# Patient Record
Sex: FEMALE | Race: WHITE | NOT HISPANIC OR LATINO
[De-identification: names, ages, dates, MRNs, and addresses within clinical notes are randomized per-mention and may not be internally consistent; named-entity substitution may affect disease eponyms.]

---

## 2023-07-03 DIAGNOSIS — O09.90 SUPERVISION OF HIGH RISK PREGNANCY, UNSPECIFIED, UNSPECIFIED TRIMESTER: ICD-10-CM

## 2023-07-17 ENCOUNTER — APPOINTMENT (OUTPATIENT)
Dept: MATERNAL FETAL MEDICINE | Facility: CLINIC | Age: 33
End: 2023-07-17

## 2023-07-17 ENCOUNTER — ASOB RESULT (OUTPATIENT)
Age: 33
End: 2023-07-17

## 2023-07-17 ENCOUNTER — APPOINTMENT (OUTPATIENT)
Dept: MATERNAL FETAL MEDICINE | Facility: CLINIC | Age: 33
End: 2023-07-17
Payer: COMMERCIAL

## 2023-07-17 PROCEDURE — 99203 OFFICE O/P NEW LOW 30 MIN: CPT | Mod: 95

## 2023-07-18 ENCOUNTER — TRANSCRIPTION ENCOUNTER (OUTPATIENT)
Age: 33
End: 2023-07-18

## 2023-07-18 ENCOUNTER — APPOINTMENT (OUTPATIENT)
Dept: CARDIOLOGY | Facility: CLINIC | Age: 33
End: 2023-07-18
Payer: COMMERCIAL

## 2023-07-18 DIAGNOSIS — Z3A.13 13 WEEKS GESTATION OF PREGNANCY: ICD-10-CM

## 2023-07-18 PROCEDURE — 99203 OFFICE O/P NEW LOW 30 MIN: CPT | Mod: 95

## 2023-07-26 ENCOUNTER — APPOINTMENT (OUTPATIENT)
Dept: PEDIATRIC CARDIOLOGY | Facility: CLINIC | Age: 33
End: 2023-07-26

## 2023-08-01 DIAGNOSIS — Q24.9 DISEASES OF THE CIRCULATORY SYSTEM COMPLICATING PREGNANCY, FIRST TRIMESTER: ICD-10-CM

## 2023-08-01 DIAGNOSIS — O99.411 DISEASES OF THE CIRCULATORY SYSTEM COMPLICATING PREGNANCY, FIRST TRIMESTER: ICD-10-CM

## 2023-08-02 ENCOUNTER — APPOINTMENT (OUTPATIENT)
Dept: PEDIATRIC CARDIOLOGY | Facility: CLINIC | Age: 33
End: 2023-08-02
Payer: COMMERCIAL

## 2023-08-02 VITALS
WEIGHT: 154.32 LBS | DIASTOLIC BLOOD PRESSURE: 72 MMHG | HEART RATE: 57 BPM | OXYGEN SATURATION: 100 % | HEIGHT: 66.14 IN | SYSTOLIC BLOOD PRESSURE: 115 MMHG | BODY MASS INDEX: 24.8 KG/M2

## 2023-08-02 DIAGNOSIS — Q21.0 VENTRICULAR SEPTAL DEFECT: ICD-10-CM

## 2023-08-02 DIAGNOSIS — Z78.9 OTHER SPECIFIED HEALTH STATUS: ICD-10-CM

## 2023-08-02 PROCEDURE — 93303 ECHO TRANSTHORACIC: CPT

## 2023-08-02 PROCEDURE — 93320 DOPPLER ECHO COMPLETE: CPT

## 2023-08-02 PROCEDURE — 93325 DOPPLER ECHO COLOR FLOW MAPG: CPT

## 2023-08-02 PROCEDURE — 99204 OFFICE O/P NEW MOD 45 MIN: CPT

## 2023-08-02 RX ORDER — LEVOTHYROXINE SODIUM 50 UG/1
50 CAPSULE ORAL DAILY
Refills: 0 | Status: ACTIVE | COMMUNITY

## 2023-08-02 NOTE — PHYSICAL EXAM
[General Appearance - Alert] : alert [General Appearance - Well Nourished] : well nourished [General Appearance - Well-Appearing] : well appearing [] : no respiratory distress [Chest Surgical / Traumatic Scar] : chest incision well healed [Heart Sounds] : normal S1 and S2 [Systolic] : systolic [II] : a grade 2/6

## 2023-08-02 NOTE — HISTORY OF PRESENT ILLNESS
[FreeTextEntry1] : We had the pleasure of seeing BEENA FONTANA for evaluation today in the Adult Congenital Heart Program at [facility]. This is her first visit here with us today. Beena is a 32-year-old female with an atrial septal defect and ventricular septal defect. Her atrial septal defect was closed with a Cardioseal device at 9-years of age. Her small VSD was not closed.  Beena is currently 16 weeks' gestation. This is her 2nd pregnancy. She has a 3-year-old daughter who was delivered via  at [place]. Her OB is Daisy Mayen and she is planning to deliver at [facility]. She developed hypothyroidism following her first pregnancy and remains on levothyroxine. In addition, she is taking a multivitamin. During her first trimester she struggled with nausea and extreme fatigue but is now feeling better.  From a cardiovascular perspective, she has no complaints of chest pain, palpitations, shortness of breath, peripheral edema, dizziness or syncope. Prior to pregnancy she was extremely active in kickboxing and is now just beginning to get back into walking since her nausea has subsided.

## 2023-08-02 NOTE — DISCUSSION/SUMMARY
[Needs SBE Prophylaxis] : [unfilled]  needs bacterial endocarditis prophylaxis. SBE prophylaxis is indicated for dental and invasive ENT procedures. (Circulation. 2007; 116: 4804-9772) [FreeTextEntry1] : Diana is 32year old female s/p repair of atrial septal defect with Cardioseal devise with trivial residual left to right shunting. There is no evidence of a VSD on echocardiogram. We reviewed the typical physiologic changes associated with pregnancy; specifically, that her blood volume will increase by 40-50% which translates to an increase in cardiac output. She will have an increase in her heart rate which may increase her risk of palpitations.  This usually occurs between 28-32 weeks but can occur anytime.   All pregnant women are at risk of pre-eclampsia, before delivery, during delivery and in the post-partum period.  We counseled her on the signs and symptoms for which she needs to seek immediate medical attention; pressure in her chest, significant leg swelling, fainting, new onset headaches, change in vision or any increased blood pressure [id number]. As a preventative measure we generally recommend Aspirin 162mg daily but have told her she should discuss this with her OB.  We discussed that there is a 7-8% absolute risk of congenital heart disease in children born to mothers with congenital heart disease. As a result, current guidelines would suggest a screening fetal echocardiogram between 18 and 22 weeks of gestation. Our nurse navigator will reach out to her to schedule an appointment.  Based on her anatomy she would be classified as a modified WHO maternal cardiac risk classification of I-II which translates to small increased risk of maternal complications, and she has been counseled and understands the risks related to pregnancy and her congenital heart disease. We anticipate her being able to carry to term and deliver via a vaginal route. Should she require an epidural, she should be able to have one. Should a  become necessary for obstetric and/ or fetal indications, she may receive general or spinal anesthesia. Due to her small residual shunt at the site of her repair SBE prophylaxis is indicated.  We would like to see her back between 30-32 weeks of gestation for a visit with an echocardiogram or sooner if she has any clinical concerns.

## 2023-08-02 NOTE — CARDIOLOGY SUMMARY
[Today's Date] : [unfilled] [FreeTextEntry1] : sinus bradycardia, vent rate 55 bpm [FreeTextEntry2] : Summary: 1. S/p ASD device closure. Trivial residual left-to-right shunting. 2. Normal left ventricular size, morphology and systolic function. 3. Normal right ventricular morphology with qualitatively normal size and systolic function. 4. No evidence of pulmonary hypertension based on systolic interventricular septal configuration, but quantitative estimates of pulmonary artery pressure were inadequate. 5. No pericardial effusion.

## 2023-08-02 NOTE — CONSULT LETTER
[Today's Date] : [unfilled] [Name] : Name: [unfilled] [] : : ~~ [Today's Date:] : [unfilled] [Dear  ___:] : Dear Dr. [unfilled]: [Consult] : I had the pleasure of evaluating your patient, [unfilled]. My full evaluation follows. [Sincerely,] : Sincerely, [___] : [unfilled] [Consult - Multiple Provider] : Thank you very much for allowing us to participate in the care of this patient. If you have any questions, please do not hesitate to contact us. [FreeTextEntry4] : Daisy Enciso, DO [FreeTextEntry5] : [address] [FreeTextEntry6] : [place] [address] [de-identified] : Janette Nguyễn, MSN, CPNP-AC, PC Pediatric Cardiology, Adult Congenital Cardiology [facility] Physician [facility]  Maura Vo MD, HUSSEIN Director, Adult Congenital Heart , High Risk Cardiovascular Obstetrics [facility] Physician [facility]  [address]: [phone] [facility] Office: [phone] [facility] Office: [phone]

## 2023-08-02 NOTE — REASON FOR VISIT
[Initial Consultation] : an initial consultation for [Atrial Septal Defect] : an atrial septal defect [Ventricular Septal Defect] : a ventricular septal defect [Patient] : patient [FreeTextEntry3] : ASD closure

## 2023-08-08 PROBLEM — Z3A.13 13 WEEKS GESTATION OF PREGNANCY: Status: ACTIVE | Noted: 2023-08-08

## 2023-08-08 NOTE — ASSESSMENT
[FreeTextEntry1] : Ms. Yair Valadez is a 32 year old female that presents to the Women's Heart Program for a cardiovascular evaluation during her current pregnancy.  She carries a medical  history of  hypothyoridsim and an ASD/VSD repair in 1999 at [facility].  Patient is 13 weeks pregnant.  #Hypothyroidism    Continue on Levothyroxine 50 mcg PO QD  #History of Congenital Heart Repair Recommending an  In-office physical examination and 12 lead EKG Echocardiogram to assess cardiac structure and function Referred to Congenital Specialist, Dr. Maura Vo   - Encouraged patient to participate in  healthy walking and eating habits, focusing on a Mediterranean style of eating.  - Encouraged the patient to find healthy outlets and coping mechanisms to help manage stress, such as reducing workload if possible, spending time with family and friends, engaging in an enjoyable hobby, or using meditation or mindfulness techniques.

## 2023-08-08 NOTE — HISTORY OF PRESENT ILLNESS
[Home] : at home, [unfilled] , at the time of the visit. [Other Location: e.g. Home (Enter Location, City,State)___] : at [unfilled] [Verbal consent obtained from patient] : the patient, [unfilled] [FreeTextEntry1] : Ms. Yair Valadez is a 32 year old female that presents to the Women's Heart Program for a cardiovascular evaluation during her current pregnancy.  She carries a medical  history of  hypothyoridsim and an ASD/VSD repair in  at [facility].  Patient is 13 weeks pregnant.  +Family history of hypothyroidism  Pregnancy history 1st pregnancy-     , full term, , no complications  2nd pregnancy-  current, 13 weeks  DUE DATE:    2024 LMP:               2023  Patient currently feels well and denies any issues with shortness of breath, chest discomfort or palpitations

## 2023-08-21 ENCOUNTER — APPOINTMENT (OUTPATIENT)
Dept: PEDIATRIC CARDIOLOGY | Facility: CLINIC | Age: 33
End: 2023-08-21
Payer: COMMERCIAL

## 2023-08-21 PROCEDURE — 76821 MIDDLE CEREBRAL ARTERY ECHO: CPT

## 2023-08-21 PROCEDURE — 76827 ECHO EXAM OF FETAL HEART: CPT

## 2023-08-21 PROCEDURE — 76820 UMBILICAL ARTERY ECHO: CPT

## 2023-08-21 PROCEDURE — 76825 ECHO EXAM OF FETAL HEART: CPT

## 2023-08-21 PROCEDURE — 99203 OFFICE O/P NEW LOW 30 MIN: CPT

## 2023-08-21 PROCEDURE — 93325 DOPPLER ECHO COLOR FLOW MAPG: CPT | Mod: 59

## 2023-09-01 ENCOUNTER — ASOB RESULT (OUTPATIENT)
Age: 33
End: 2023-09-01

## 2023-09-01 ENCOUNTER — APPOINTMENT (OUTPATIENT)
Dept: ANTEPARTUM | Facility: CLINIC | Age: 33
End: 2023-09-01
Payer: COMMERCIAL

## 2023-09-01 PROCEDURE — 76811 OB US DETAILED SNGL FETUS: CPT

## 2023-09-18 ENCOUNTER — APPOINTMENT (OUTPATIENT)
Dept: CARDIOLOGY | Facility: CLINIC | Age: 33
End: 2023-09-18

## 2023-10-02 ENCOUNTER — APPOINTMENT (OUTPATIENT)
Dept: CARDIOLOGY | Facility: CLINIC | Age: 33
End: 2023-10-02

## 2023-12-16 ENCOUNTER — APPOINTMENT (OUTPATIENT)
Dept: CARDIOLOGY | Facility: CLINIC | Age: 33
End: 2023-12-16
Payer: COMMERCIAL

## 2023-12-16 PROCEDURE — 93306 TTE W/DOPPLER COMPLETE: CPT

## 2023-12-19 ENCOUNTER — NON-APPOINTMENT (OUTPATIENT)
Age: 33
End: 2023-12-19

## 2024-01-19 ENCOUNTER — INPATIENT (INPATIENT)
Facility: HOSPITAL | Age: 34
LOS: 1 days | Discharge: HOME | End: 2024-01-21
Attending: OBSTETRICS & GYNECOLOGY | Admitting: OBSTETRICS & GYNECOLOGY
Payer: COMMERCIAL

## 2024-01-19 ENCOUNTER — TRANSCRIPTION ENCOUNTER (OUTPATIENT)
Age: 34
End: 2024-01-19

## 2024-01-19 VITALS — HEART RATE: 79 BPM | SYSTOLIC BLOOD PRESSURE: 129 MMHG | DIASTOLIC BLOOD PRESSURE: 52 MMHG

## 2024-01-19 DIAGNOSIS — Z87.74 PERSONAL HISTORY OF (CORRECTED) CONGENITAL MALFORMATIONS OF HEART AND CIRCULATORY SYSTEM: Chronic | ICD-10-CM

## 2024-01-19 DIAGNOSIS — O26.899 OTHER SPECIFIED PREGNANCY RELATED CONDITIONS, UNSPECIFIED TRIMESTER: ICD-10-CM

## 2024-01-19 DIAGNOSIS — Z34.80 ENCOUNTER FOR SUPERVISION OF OTHER NORMAL PREGNANCY, UNSPECIFIED TRIMESTER: ICD-10-CM

## 2024-01-19 DIAGNOSIS — Z98.890 OTHER SPECIFIED POSTPROCEDURAL STATES: Chronic | ICD-10-CM

## 2024-01-19 LAB
BASOPHILS # BLD AUTO: 0.05 K/UL — SIGNIFICANT CHANGE UP (ref 0–0.2)
BASOPHILS NFR BLD AUTO: 0.3 % — SIGNIFICANT CHANGE UP (ref 0–2)
EOSINOPHIL # BLD AUTO: 0.02 K/UL — SIGNIFICANT CHANGE UP (ref 0–0.5)
EOSINOPHIL NFR BLD AUTO: 0.1 % — SIGNIFICANT CHANGE UP (ref 0–6)
HCT VFR BLD CALC: 35.4 % — SIGNIFICANT CHANGE UP (ref 34.5–45)
HGB BLD-MCNC: 11.9 G/DL — SIGNIFICANT CHANGE UP (ref 11.5–15.5)
IMM GRANULOCYTES NFR BLD AUTO: 0.9 % — SIGNIFICANT CHANGE UP (ref 0–0.9)
LYMPHOCYTES # BLD AUTO: 14.2 % — SIGNIFICANT CHANGE UP (ref 13–44)
LYMPHOCYTES # BLD AUTO: 2.41 K/UL — SIGNIFICANT CHANGE UP (ref 1–3.3)
MCHC RBC-ENTMCNC: 29 PG — SIGNIFICANT CHANGE UP (ref 27–34)
MCHC RBC-ENTMCNC: 33.6 GM/DL — SIGNIFICANT CHANGE UP (ref 32–36)
MCV RBC AUTO: 86.3 FL — SIGNIFICANT CHANGE UP (ref 80–100)
MONOCYTES # BLD AUTO: 0.98 K/UL — HIGH (ref 0–0.9)
MONOCYTES NFR BLD AUTO: 5.8 % — SIGNIFICANT CHANGE UP (ref 2–14)
NEUTROPHILS # BLD AUTO: 13.33 K/UL — HIGH (ref 1.8–7.4)
NEUTROPHILS NFR BLD AUTO: 78.7 % — HIGH (ref 43–77)
NRBC # BLD: 0 /100 WBCS — SIGNIFICANT CHANGE UP (ref 0–0)
PLATELET # BLD AUTO: 136 K/UL — LOW (ref 150–400)
RBC # BLD: 4.1 M/UL — SIGNIFICANT CHANGE UP (ref 3.8–5.2)
RBC # FLD: 13.2 % — SIGNIFICANT CHANGE UP (ref 10.3–14.5)
WBC # BLD: 16.95 K/UL — HIGH (ref 3.8–10.5)
WBC # FLD AUTO: 16.95 K/UL — HIGH (ref 3.8–10.5)

## 2024-01-19 PROCEDURE — 86077 PHYS BLOOD BANK SERV XMATCH: CPT

## 2024-01-19 RX ORDER — OXYTOCIN 10 UNIT/ML
10 VIAL (ML) INJECTION ONCE
Refills: 0 | Status: COMPLETED | OUTPATIENT
Start: 2024-01-19 | End: 2024-01-19

## 2024-01-19 RX ORDER — TETANUS TOXOID, REDUCED DIPHTHERIA TOXOID AND ACELLULAR PERTUSSIS VACCINE, ADSORBED 5; 2.5; 8; 8; 2.5 [IU]/.5ML; [IU]/.5ML; UG/.5ML; UG/.5ML; UG/.5ML
0.5 SUSPENSION INTRAMUSCULAR ONCE
Refills: 0 | Status: DISCONTINUED | OUTPATIENT
Start: 2024-01-19 | End: 2024-01-21

## 2024-01-19 RX ORDER — IBUPROFEN 200 MG
600 TABLET ORAL EVERY 6 HOURS
Refills: 0 | Status: DISCONTINUED | OUTPATIENT
Start: 2024-01-19 | End: 2024-01-21

## 2024-01-19 RX ORDER — KETOROLAC TROMETHAMINE 30 MG/ML
30 SYRINGE (ML) INJECTION ONCE
Refills: 0 | Status: DISCONTINUED | OUTPATIENT
Start: 2024-01-19 | End: 2024-01-21

## 2024-01-19 RX ORDER — CHLORHEXIDINE GLUCONATE 213 G/1000ML
1 SOLUTION TOPICAL DAILY
Refills: 0 | Status: DISCONTINUED | OUTPATIENT
Start: 2024-01-19 | End: 2024-01-19

## 2024-01-19 RX ORDER — LANOLIN
1 OINTMENT (GRAM) TOPICAL EVERY 6 HOURS
Refills: 0 | Status: DISCONTINUED | OUTPATIENT
Start: 2024-01-19 | End: 2024-01-21

## 2024-01-19 RX ORDER — MAGNESIUM HYDROXIDE 400 MG/1
30 TABLET, CHEWABLE ORAL
Refills: 0 | Status: DISCONTINUED | OUTPATIENT
Start: 2024-01-19 | End: 2024-01-21

## 2024-01-19 RX ORDER — SODIUM CHLORIDE 9 MG/ML
1000 INJECTION, SOLUTION INTRAVENOUS
Refills: 0 | Status: DISCONTINUED | OUTPATIENT
Start: 2024-01-19 | End: 2024-01-19

## 2024-01-19 RX ORDER — OXYCODONE HYDROCHLORIDE 5 MG/1
5 TABLET ORAL
Refills: 0 | Status: DISCONTINUED | OUTPATIENT
Start: 2024-01-19 | End: 2024-01-21

## 2024-01-19 RX ORDER — OXYTOCIN 10 UNIT/ML
41.67 VIAL (ML) INJECTION
Qty: 20 | Refills: 0 | Status: DISCONTINUED | OUTPATIENT
Start: 2024-01-19 | End: 2024-01-21

## 2024-01-19 RX ORDER — HYDROCORTISONE 1 %
1 OINTMENT (GRAM) TOPICAL EVERY 6 HOURS
Refills: 0 | Status: DISCONTINUED | OUTPATIENT
Start: 2024-01-19 | End: 2024-01-21

## 2024-01-19 RX ORDER — AER TRAVELER 0.5 G/1
1 SOLUTION RECTAL; TOPICAL EVERY 4 HOURS
Refills: 0 | Status: DISCONTINUED | OUTPATIENT
Start: 2024-01-19 | End: 2024-01-21

## 2024-01-19 RX ORDER — IBUPROFEN 200 MG
600 TABLET ORAL EVERY 6 HOURS
Refills: 0 | Status: COMPLETED | OUTPATIENT
Start: 2024-01-19 | End: 2024-12-17

## 2024-01-19 RX ORDER — SIMETHICONE 80 MG/1
80 TABLET, CHEWABLE ORAL EVERY 4 HOURS
Refills: 0 | Status: DISCONTINUED | OUTPATIENT
Start: 2024-01-19 | End: 2024-01-21

## 2024-01-19 RX ORDER — SODIUM CHLORIDE 9 MG/ML
3 INJECTION INTRAMUSCULAR; INTRAVENOUS; SUBCUTANEOUS EVERY 8 HOURS
Refills: 0 | Status: DISCONTINUED | OUTPATIENT
Start: 2024-01-19 | End: 2024-01-21

## 2024-01-19 RX ORDER — CITRIC ACID/SODIUM CITRATE 300-500 MG
15 SOLUTION, ORAL ORAL EVERY 6 HOURS
Refills: 0 | Status: DISCONTINUED | OUTPATIENT
Start: 2024-01-19 | End: 2024-01-19

## 2024-01-19 RX ORDER — DIBUCAINE 1 %
1 OINTMENT (GRAM) RECTAL EVERY 6 HOURS
Refills: 0 | Status: DISCONTINUED | OUTPATIENT
Start: 2024-01-19 | End: 2024-01-21

## 2024-01-19 RX ORDER — OXYTOCIN 10 UNIT/ML
333.33 VIAL (ML) INJECTION
Qty: 20 | Refills: 0 | Status: DISCONTINUED | OUTPATIENT
Start: 2024-01-19 | End: 2024-01-21

## 2024-01-19 RX ORDER — ACETAMINOPHEN 500 MG
975 TABLET ORAL
Refills: 0 | Status: DISCONTINUED | OUTPATIENT
Start: 2024-01-19 | End: 2024-01-21

## 2024-01-19 RX ORDER — PRAMOXINE HYDROCHLORIDE 150 MG/15G
1 AEROSOL, FOAM RECTAL EVERY 4 HOURS
Refills: 0 | Status: DISCONTINUED | OUTPATIENT
Start: 2024-01-19 | End: 2024-01-21

## 2024-01-19 RX ORDER — DIPHENHYDRAMINE HCL 50 MG
25 CAPSULE ORAL EVERY 6 HOURS
Refills: 0 | Status: DISCONTINUED | OUTPATIENT
Start: 2024-01-19 | End: 2024-01-21

## 2024-01-19 RX ORDER — LEVOTHYROXINE SODIUM 125 MCG
50 TABLET ORAL DAILY
Refills: 0 | Status: DISCONTINUED | OUTPATIENT
Start: 2024-01-19 | End: 2024-01-21

## 2024-01-19 RX ORDER — BENZOCAINE 10 %
1 GEL (GRAM) MUCOUS MEMBRANE EVERY 6 HOURS
Refills: 0 | Status: DISCONTINUED | OUTPATIENT
Start: 2024-01-19 | End: 2024-01-21

## 2024-01-19 RX ORDER — PIPERACILLIN AND TAZOBACTAM 4; .5 G/20ML; G/20ML
4.5 INJECTION, POWDER, LYOPHILIZED, FOR SOLUTION INTRAVENOUS EVERY 8 HOURS
Refills: 0 | Status: COMPLETED | OUTPATIENT
Start: 2024-01-19 | End: 2024-01-20

## 2024-01-19 RX ORDER — OXYTOCIN 10 UNIT/ML
2 VIAL (ML) INJECTION
Qty: 30 | Refills: 0 | Status: DISCONTINUED | OUTPATIENT
Start: 2024-01-19 | End: 2024-01-21

## 2024-01-19 RX ORDER — OXYCODONE HYDROCHLORIDE 5 MG/1
5 TABLET ORAL ONCE
Refills: 0 | Status: DISCONTINUED | OUTPATIENT
Start: 2024-01-19 | End: 2024-01-21

## 2024-01-19 RX ADMIN — Medication 0.2 MILLIGRAM(S): at 16:51

## 2024-01-19 RX ADMIN — Medication 600 MILLIGRAM(S): at 23:00

## 2024-01-19 RX ADMIN — PIPERACILLIN AND TAZOBACTAM 200 GRAM(S): 4; .5 INJECTION, POWDER, LYOPHILIZED, FOR SOLUTION INTRAVENOUS at 18:45

## 2024-01-19 RX ADMIN — Medication 2 MILLIUNIT(S)/MIN: at 13:50

## 2024-01-19 RX ADMIN — Medication 10 UNIT(S): at 16:49

## 2024-01-19 RX ADMIN — Medication 975 MILLIGRAM(S): at 20:03

## 2024-01-19 NOTE — PROVIDER CONTACT NOTE (OTHER) - RECOMMENDATIONS
As per MIGUELITO Gutierrez, pt to be given po tyenol and toradol to be given on postpartum, pt allowed to go to floor as per MIGUELITO Gutierrez

## 2024-01-19 NOTE — DISCHARGE NOTE OB - PATIENT PORTAL LINK FT
You can access the FollowMyHealth Patient Portal offered by [facility] by registering at the following website: [email or web address]. By joining [employer]’s FollowMyHealth portal, you will also be able to view your health information using other applications (apps) compatible with our system.

## 2024-01-19 NOTE — PROVIDER CONTACT NOTE (OTHER) - ASSESSMENT
Bleeding moderate, fundus midline, pt reports no headache or dizziness. pt was able to void 600ml at 1950

## 2024-01-19 NOTE — DISCHARGE NOTE OB - CARE PLAN
Principal Discharge DX:	Vaginal delivery  Assessment and plan of treatment:	Nothing per vagina x 2 weeks - no intercourse, no tampons  Shower only, no baths/pools  Tylenol, motrin prn   1 Principal Discharge DX:	Vaginal delivery  Assessment and plan of treatment:	Nothing per vagina x 6 weeks - no intercourse, no tampons  Shower only, no baths/pools  Tylenol, motrin prn

## 2024-01-19 NOTE — DISCHARGE NOTE OB - HOSPITAL COURSE
32 yo  at 40w admitted in active labor. Pt had an uncomplicated vaginal delivery and postpartum course. Pt discharged home on PPD 32 yo  at 40w admitted in active labor. Pt had an uncomplicated vaginal delivery and postpartum course. Postpartum had temp and was tx with antibiotics for chorio. Remained afebrile after 24 h and was stable for dc. Pt discharged home on PPD

## 2024-01-19 NOTE — OB PROVIDER H&P - NSLOWPPHRISK_OBGYN_A_OB
No previous uterine incision/Michelle Pregnancy/Less than or equal to 4 previous vaginal births/No known bleeding disorder/No history of postpartum hemorrhage/No other PPH risks indicated

## 2024-01-19 NOTE — PROVIDER CONTACT NOTE (OTHER) - SITUATION
Pt is a 33 yr old  that had  at 1640. Bleeding is moderate and 3 uterotonics were given during delivery due to uterine atony and an ebl of 350.

## 2024-01-19 NOTE — OB PROVIDER DELIVERY SUMMARY - NSSELHIDDEN_OBGYN_ALL_OB_FT
[NS_DeliveryAttending1_OBGYN_ALL_OB_FT:[id number]=],[NS_DeliveryAssist1_OBGYN_ALL_OB_FT:[id number]==]

## 2024-01-19 NOTE — OB RN DELIVERY SUMMARY - NSSELHIDDEN_OBGYN_ALL_OB_FT
[NS_DeliveryAttending1_OBGYN_ALL_OB_FT:[id number]=],[NS_DeliveryAssist1_OBGYN_ALL_OB_FT:[id number]==],[NS_DeliveryRN_OBGYN_ALL_OB_FT:[id number]]

## 2024-01-19 NOTE — OB RN DELIVERY SUMMARY - NS_SEPSISRSKCALC_OBGYN_ALL_OB_FT
EOS calculated successfully. EOS Risk Factor: 0.5/1000 live births ([facility] national incidence); GA=40w;Temp=98.6; ROM=4.25; GBS='Negative'; Antibiotics='No antibiotics or any antibiotics < 2 hrs prior to birth'

## 2024-01-19 NOTE — OB PROVIDER H&P - NS ATTEND AMEND GEN_ALL_CORE FT
32 yo  at 40w admitted in active labor. Maternal and fetal status reassuring. GBS negative. Plan for epidural and AROM. Anticipate .

## 2024-01-19 NOTE — OB RN PATIENT PROFILE - SUPPORT PERSON NAME
Conjuntivae and eyelids appear normal , Sclerae : White without injection, no icterus jorge a briseno

## 2024-01-19 NOTE — PROVIDER CONTACT NOTE (OTHER) - BACKGROUND
Pt is a 33 yr old  that had  at 1640. Pt vital signs WNL except for fever of 38.6C at 1735. Zosyn given at 1845. Temperature now WNL.

## 2024-01-19 NOTE — OB PROVIDER H&P - NSICDXPASTSURGICALHX_GEN_ALL_CORE_FT
PAST SURGICAL HISTORY:  History of repair of congenital atrial septal defect (ASD)     S/P reconstruction of ACL of right knee using hamstring autograft

## 2024-01-19 NOTE — DISCHARGE NOTE OB - CARE PROVIDER_API CALL
Mikki Edwards  Obstetrics and Gynecology  [address], [address]  [place] [address]  Phone: [phone]  Fax: [phone]  Follow Up Time:

## 2024-01-19 NOTE — DISCHARGE NOTE OB - PLAN OF CARE
Nothing per vagina x 2 weeks - no intercourse, no tampons  Shower only, no baths/pools  Tylenol, motrin prn Nothing per vagina x 6 weeks - no intercourse, no tampons  Shower only, no baths/pools  Tylenol, motrin prn

## 2024-01-19 NOTE — OB PROVIDER LABOR PROGRESS NOTE - NS_EFFACEMANT_OBGYN_ALL_OB_NU
90
Number Of Freeze-Thaw Cycles: 1 freeze-thaw cycle
Post-Care Instructions: Apply Vaseline frequently. WCI given
Consent: Verbal consent was obtained and risks were reviewed including, but not limited to, scarring, infection, bleeding, scabbing, and incomplete removal. Discussed wound care instruction
Render Note In Bullet Format When Appropriate: No
Detail Level: Detailed
Duration Of Freeze Thaw-Cycle (Seconds): 5

## 2024-01-19 NOTE — OB PROVIDER H&P - HISTORY OF PRESENT ILLNESS
OB PA Admission Note    34 yo  @ 40w0d by EDC of  presents from office in labor.     +FM No LOF/VB    PNC: uncomplicated  GBS neg  EFW [id number] by yesi today    PMH: hypothyroidism  h/o ASD/VSD s/p repair     Meds: PNV, synthroid 50mcg daily  All:  NKA  GYN: denies fibroids/cysts/STI/abn pap  OB: 2018 FT  female [id number]  PSH: -VSD/ASD repair  2010 R thumb ORIF  2012 R knee ACL repair  Social: denies toxic habits  Psych: denies history

## 2024-01-19 NOTE — OB PROVIDER H&P - NSICDXPASTMEDICALHX_GEN_ALL_CORE_FT
PAST MEDICAL HISTORY:  H/O congenital atrial septal defect (ASD) repair     Hypothyroidism, adult

## 2024-01-19 NOTE — DISCHARGE NOTE OB - MATERIALS PROVIDED
[facility] Screening Program/Bottle Feeding Log/Guide to Postpartum Care/[facility] Hearing Screen Program/Back To Sleep Handout/Shaken Baby Prevention Handout/Birth Certificate Instructions/Discharge Medication Information for Patients and Families Pocket Guide

## 2024-01-19 NOTE — OB RN PATIENT PROFILE - ALERT: PERTINENT HISTORY
fetal echo/Fetal Sonogram/1st Trimester Sonogram/20 Week Level II Sonogram/Fetal Non-Stress Test (NST)

## 2024-01-19 NOTE — OB PROVIDER H&P - NSHPLABSRESULTS_GEN_ALL_CORE
TTE 12/2023:    1.  Left ventricular systolic function is normal with an ejection fraction of 64 % by visual interpretation   visually estimated at 60 to 65 %.  2.  Minimal to mild mitral regurgitation.  3.  Minimal to mild tricuspid regurgitation.  4.  Status post repair of an atrial septal defect without shunt.  5.  Status post ventricular septal defect repair by surgical patch.  6.  Thickened mitral valve leaflets.  7.  Trace pulmonic regurgitation.  ________________________________________________________________________________________

## 2024-01-19 NOTE — OB PROVIDER DELIVERY SUMMARY - NSPROVIDERDELIVERYNOTE_OBGYN_ALL_OB_FT
Pt fully dilated and pushing. Delivery of head in direct OA over first degree laceration. Allowed to restitue right shoulder anterior, delivered easily through umbilical cord with posterior shoulder then body. Baby stimulated and given to mom for suctioning and skin to skin. Apgars 9/9.  After delayed cord clamping, cord cut. Spontaneous delivery of intact placenta. Uterus cleaned or residual membranes and clots. Noted to be atonic - IM pitocin administered and MN cytotec with improvement of tone.  On inspection, cervix, vagina and rectum intact. 1st degree repaired w/ 2-0 Vicryl rapide. EBL 350cc.    Srg: Dr. Edwards   Asst: MIGUELITO Morales

## 2024-01-19 NOTE — OB PROVIDER H&P - NSHPPHYSICALEXAM_GEN_ALL_CORE
ICU Vital Signs Last 24 Hrs  T(C): 36.7 (19 Jan 2024 10:20), Max: 36.7 (19 Jan 2024 10:20)  T(F): 98.06 (19 Jan 2024 10:20), Max: 98.06 (19 Jan 2024 10:20)  HR: 70 (19 Jan 2024 10:34) (70 - 79)  BP: 129/52 (19 Jan 2024 10:12) (129/52 - 129/52)  BP(mean): --  ABP: --  ABP(mean): --  RR: --  SpO2: 92% (19 Jan 2024 10:34) (92% - 99%)    Gen: NAD  Heart: S1S2 RRR  Lungs: CTA b/l  Abd: gravid NTND  LE: no calf tenderness    VE 5-6/80/-2    FHT: cat i  Veguita q2-3min    Sono vertex

## 2024-01-19 NOTE — OB PROVIDER H&P - ASSESSMENT
A/P: 32 yo P1 @ 40w presents in active labor  - admit to L&D  - routine labs  - clear diet then NPO when in active labor  - IV hydration  - fetus: cat i tracing, continuous monitoring, vertex  - labor - AROM and expectant management  - anesthesia consult prn  - hypothyroidism- synthroid 50mcg daily   - anticipate vaginal delivery    d/w Dr Grace Hernandez, PA

## 2024-01-19 NOTE — DISCHARGE NOTE OB - MEDICATION SUMMARY - MEDICATIONS TO TAKE
I will START or STAY ON the medications listed below when I get home from the hospital:    ibuprofen 600 mg oral tablet  -- 1 tab(s) by mouth every 6 hours  -- Indication: For pain     acetaminophen 325 mg oral tablet  -- 3 tab(s) by mouth every 8 hours  -- Indication: For pain     Prenatal Multivitamins with Folic Acid 1 mg oral tablet  -- 1 tab(s) by mouth once a day  -- Indication: For routine postpartum care    levothyroxine 50 mcg (0.05 mg) oral tablet  -- 1 tab(s) by mouth once a day  -- Indication: For Hypothyroidism, adult

## 2024-01-19 NOTE — OB PROVIDER H&P - NSMATERNALFETALCONCERNS_OBGYN_ALL_OB_FT
Fetal Alert  23: Fetal echo done 23 due to maternal CHD.Normal study. Given the maternal history of atrial septal defect, I recommended a follow up non urgent outpatient  cardiology consultation and echocardiogram. If there are any clinical concerns this evaluation can be done sooner. Kacy Dale RN.

## 2024-01-20 LAB
HCT VFR BLD CALC: 31.7 % — LOW (ref 34.5–45)
HGB BLD-MCNC: 10.4 G/DL — LOW (ref 11.5–15.5)
MCHC RBC-ENTMCNC: 29.1 PG — SIGNIFICANT CHANGE UP (ref 27–34)
MCHC RBC-ENTMCNC: 32.8 GM/DL — SIGNIFICANT CHANGE UP (ref 32–36)
MCV RBC AUTO: 88.5 FL — SIGNIFICANT CHANGE UP (ref 80–100)
NRBC # BLD: 0 /100 WBCS — SIGNIFICANT CHANGE UP (ref 0–0)
PLATELET # BLD AUTO: 101 K/UL — LOW (ref 150–400)
RBC # BLD: 3.58 M/UL — LOW (ref 3.8–5.2)
RBC # FLD: 13.4 % — SIGNIFICANT CHANGE UP (ref 10.3–14.5)
WBC # BLD: 21.84 K/UL — HIGH (ref 3.8–10.5)
WBC # FLD AUTO: 21.84 K/UL — HIGH (ref 3.8–10.5)

## 2024-01-20 RX ORDER — IBUPROFEN 200 MG
1 TABLET ORAL
Qty: 0 | Refills: 0 | DISCHARGE
Start: 2024-01-20

## 2024-01-20 RX ORDER — ACETAMINOPHEN 500 MG
3 TABLET ORAL
Qty: 0 | Refills: 0 | DISCHARGE
Start: 2024-01-20

## 2024-01-20 RX ORDER — LEVOTHYROXINE SODIUM 125 MCG
1 TABLET ORAL
Qty: 0 | Refills: 0 | DISCHARGE
Start: 2024-01-20

## 2024-01-20 RX ADMIN — Medication 600 MILLIGRAM(S): at 18:54

## 2024-01-20 RX ADMIN — Medication 600 MILLIGRAM(S): at 12:32

## 2024-01-20 RX ADMIN — Medication 975 MILLIGRAM(S): at 15:05

## 2024-01-20 RX ADMIN — Medication 600 MILLIGRAM(S): at 23:46

## 2024-01-20 RX ADMIN — Medication 50 MICROGRAM(S): at 05:20

## 2024-01-20 RX ADMIN — Medication 975 MILLIGRAM(S): at 09:47

## 2024-01-20 RX ADMIN — PIPERACILLIN AND TAZOBACTAM 200 GRAM(S): 4; .5 INJECTION, POWDER, LYOPHILIZED, FOR SOLUTION INTRAVENOUS at 02:42

## 2024-01-20 RX ADMIN — Medication 975 MILLIGRAM(S): at 09:17

## 2024-01-20 RX ADMIN — Medication 600 MILLIGRAM(S): at 07:00

## 2024-01-20 RX ADMIN — Medication 975 MILLIGRAM(S): at 15:35

## 2024-01-20 RX ADMIN — Medication 1 TABLET(S): at 12:32

## 2024-01-20 RX ADMIN — Medication 600 MILLIGRAM(S): at 13:02

## 2024-01-20 RX ADMIN — PIPERACILLIN AND TAZOBACTAM 200 GRAM(S): 4; .5 INJECTION, POWDER, LYOPHILIZED, FOR SOLUTION INTRAVENOUS at 11:10

## 2024-01-20 RX ADMIN — Medication 600 MILLIGRAM(S): at 00:00

## 2024-01-20 RX ADMIN — Medication 975 MILLIGRAM(S): at 20:30

## 2024-01-20 RX ADMIN — Medication 600 MILLIGRAM(S): at 18:24

## 2024-01-20 RX ADMIN — Medication 975 MILLIGRAM(S): at 02:41

## 2024-01-20 RX ADMIN — Medication 600 MILLIGRAM(S): at 06:06

## 2024-01-20 RX ADMIN — Medication 975 MILLIGRAM(S): at 21:00

## 2024-01-20 NOTE — PROGRESS NOTE ADULT - PROBLEM SELECTOR PLAN 1
Increase OOB  Regular diet  PO Pain protocol  RH neg, rhogam ordered  Routine Postpartum Care      Yuliana Taylor PA-C Increase OOB  Regular diet  PO Pain protocol  RH neg, baby rh negative  Routine Postpartum Care      Yuliana Taylor PA-C

## 2024-01-21 VITALS
SYSTOLIC BLOOD PRESSURE: 102 MMHG | DIASTOLIC BLOOD PRESSURE: 61 MMHG | TEMPERATURE: 98 F | RESPIRATION RATE: 18 BRPM | OXYGEN SATURATION: 98 % | HEART RATE: 76 BPM

## 2024-01-21 PROCEDURE — 86870 RBC ANTIBODY IDENTIFICATION: CPT

## 2024-01-21 PROCEDURE — 59050 FETAL MONITOR W/REPORT: CPT

## 2024-01-21 PROCEDURE — 86850 RBC ANTIBODY SCREEN: CPT

## 2024-01-21 PROCEDURE — 86900 BLOOD TYPING SEROLOGIC ABO: CPT

## 2024-01-21 PROCEDURE — 86780 TREPONEMA PALLIDUM: CPT

## 2024-01-21 PROCEDURE — 85027 COMPLETE CBC AUTOMATED: CPT

## 2024-01-21 PROCEDURE — 86901 BLOOD TYPING SEROLOGIC RH(D): CPT

## 2024-01-21 PROCEDURE — 85025 COMPLETE CBC W/AUTO DIFF WBC: CPT

## 2024-01-21 RX ADMIN — Medication 975 MILLIGRAM(S): at 02:09

## 2024-01-21 RX ADMIN — Medication 600 MILLIGRAM(S): at 00:16

## 2024-01-21 RX ADMIN — Medication 975 MILLIGRAM(S): at 10:10

## 2024-01-21 RX ADMIN — Medication 50 MICROGRAM(S): at 05:36

## 2024-01-21 RX ADMIN — Medication 975 MILLIGRAM(S): at 02:39

## 2024-01-21 RX ADMIN — Medication 600 MILLIGRAM(S): at 05:36

## 2024-01-21 RX ADMIN — Medication 600 MILLIGRAM(S): at 06:06

## 2024-01-21 RX ADMIN — Medication 975 MILLIGRAM(S): at 09:40

## 2024-01-21 NOTE — PROGRESS NOTE ADULT - SUBJECTIVE AND OBJECTIVE BOX
PA PPD#1  Note    Blood Type:  A-            Rubella:  Immune                RPR:  NR  Pain:  Controlled  Complaints:  None.  Pt. s/p Zosyn for Endometritis remains afebrile & denies fever/chills.  Pt. s/p Uterotonics for Atony after delivery with EBL=350 cc.  Pt. is OOB, voiding, passing flatus, & tolerating regular diet.  Lochia:  WNL    VS:  Vital Signs Last 24 Hrs  T(C): 36.4 (2024 05:30), Max: 36.8 (2024 17:00)  T(F): 97.6 (2024 05:30), Max: 98.2 (2024 17:00)  HR: 76 (2024 05:30) (67 - 76)  BP: 102/61 (2024 05:30) (90/52 - 108/71)  RR: 18 (2024 05:30) (17 - 18)  SpO2: 98% (2024 05:30) (97% - 98%)    Parameters below as of 2024 05:30  Patient On (Oxygen Delivery Method): room air                        10.4   21.84 )-----------( 101      ( 2024 06:41 )             31.7     Abd:  Soft, non-distended, non-tender            Fundus-firm  Laceration-1st degree: C/D/I  Extremities:  Edema - none                     Calf Tenderness - none    Assessment:    33 y.o.        PPD # 2 S/P  with atony that responded to uterotonics & endometritis that responed to Zosyn,  in stable condition.  PMHx significant for:  Gest. Hypothyroidism, ORIF of Right Thumb, ASD Repair, s/p ACL & Hamstring Grafting  Current Issues:  None  Plan:  Increase OOB             Cont. Pain Protocol             Pt. not Rhogam Candidate as Baby's Blood Type=A-             Cont. Reg. Diet             Cont. PP Yaquelin.    JOSE Evans
Postpartum Note- PPD#1    Allergies    No Known Allergies    Intolerances    Blood Type  A  --  Negative  Syphilis: negative  Rubella: Imune      S: Patient is a  34yo            PPD#1         S/P     Patient w/o complaints, pain is controlled.    Pt is OOB, tolerating PO, passing flatus. Lochia WNL.     O:  Vital Signs Last 24 Hrs  T(C): 36.3 (2024 09:13), Max: 38.6 (2024 17:35)  T(F): 97.3 (2024 09:13), Max: 101.5 (2024 17:35)  HR: 72 (2024 09:13) (62 - 142)  BP: 90/52 (2024 09:13) (90/52 - 184/125)  RR: 17 (2024 09:13) (17 - 181)  SpO2: 97% (2024 09:13) (55% - 100%)         Gen: NAD  Abdomen: Soft, nontender, non-distended, fundus firm.  Vaginal: Lochia WNL  Ext: Neg calf tenderness    LABS:    Hemoglobin: 10.4 g/dL ( @ 06:41)  Hemoglobin: 11.9 g/dL ( @ 11:18)      Hematocrit: 31.7 % ( @ 06:41)  Hematocrit: 35.4 % ( @ 11:18)

## 2024-01-21 NOTE — PROGRESS NOTE ADULT - ASSESSMENT
A/P:  33y  PPD # 1      S/P            doing well      Current Issues: Endometritis - continue zosyn x 24 hours, pt afebrile  PAST MEDICAL & SURGICAL HISTORY:  H/O congenital atrial septal defect (ASD) repair      Hypothyroidism, adult      History of repair of congenital atrial septal defect (ASD)      S/P reconstruction of ACL of right knee using hamstring autograft
 33 y.o.        PPD # 2 S/P  with atony that responded to uterotonics & endometritis that responed to Zosyn,  in stable condition.

## 2024-01-21 NOTE — PROGRESS NOTE ADULT - ATTENDING COMMENTS
PP12 s/p , doing well, afebrile. Currently on abx.  -Pt desires circumcision.  Reviewed this is elective and cosmetic procedure. Risks include bleeding, infection and damage to the penis. Risks also include risk of needing revision. Reviewed postprocedure care at length. Pt verbalized understanding and consents.   -Discharge home if 24 hr afebrile  -Precautions given   -Follow-up in office in 6 weeks      Daisy Enciso, 
PPD2 s/p , doing well   -Discharge home   -Precautions given   -Follow-up in office in 6 weeks      Daisy Enciso,

## 2024-01-22 LAB — T PALLIDUM AB TITR SER: NEGATIVE — SIGNIFICANT CHANGE UP

## 2024-02-01 PROBLEM — E03.9 HYPOTHYROIDISM, UNSPECIFIED: Chronic | Status: ACTIVE | Noted: 2024-01-19

## 2024-02-01 PROBLEM — Z87.74 PERSONAL HISTORY OF (CORRECTED) CONGENITAL MALFORMATIONS OF HEART AND CIRCULATORY SYSTEM: Chronic | Status: ACTIVE | Noted: 2024-01-19

## 2024-02-05 ENCOUNTER — APPOINTMENT (OUTPATIENT)
Dept: ORTHOPEDIC SURGERY | Facility: CLINIC | Age: 34
End: 2024-02-05

## 2024-02-29 ENCOUNTER — APPOINTMENT (OUTPATIENT)
Dept: ORTHOPEDIC SURGERY | Facility: CLINIC | Age: 34
End: 2024-02-29
Payer: COMMERCIAL

## 2024-02-29 VITALS — WEIGHT: 165 LBS | HEIGHT: 66 IN | BODY MASS INDEX: 26.52 KG/M2

## 2024-02-29 DIAGNOSIS — M65.4 RADIAL STYLOID TENOSYNOVITIS [DE QUERVAIN]: ICD-10-CM

## 2024-02-29 PROCEDURE — 20550 NJX 1 TENDON SHEATH/LIGAMENT: CPT | Mod: 50

## 2024-02-29 PROCEDURE — 99204 OFFICE O/P NEW MOD 45 MIN: CPT | Mod: 25

## 2024-02-29 PROCEDURE — 73110 X-RAY EXAM OF WRIST: CPT | Mod: 50

## 2024-02-29 NOTE — IMAGING
[de-identified] : LEFT HAND skin intact. mild swelling of 1st ext compartment. TTP to 1st ext compartment. wrist ROM: good extension, flexion. good pronation, supination. good EPL, FPL. good finger extension, flex to full fist. good finger abduction and adduction.  SILT to median, ulnar, radial distribution.  palpable radial pulse, brisk cap refill all digits. no triggering. + Finkelstein's test.   RIGHT HAND skin intact. minimal swelling of 1st ext compartment. TTP to 1st ext compartment. wrist ROM: good extension, flexion. good pronation, supination. good EPL, FPL. good finger extension, flex to full fist. good finger abduction and adduction.  SILT to median, ulnar, radial distribution.  palpable radial pulse, brisk cap refill all digits. no triggering. + Finkelstein's test.   XRAYS OF LEFT WRIST: no acute displaced fracture or dislocation. XRAYS OF RIGHT WRIST: no acute displaced fracture or dislocation. anchors in thumb proximal phalanx ulnar base.

## 2024-02-29 NOTE — ASSESSMENT
[FreeTextEntry1] : The condition was explained to the patient.  Discussed risks and benefits of treatment options for tenosynovitis - activity modification, NSAID, splint, steroid injection, or surgery. Patient would like to proceed with CSI for DeQuervain's tenosynovitis. - Discussed risks, benefits, and alternatives as well as contents of injection. Risks include, but are not limited allergic reaction, flare reaction, injection site pain, bruising, numbness, increased blood sugar, skin discoloration, fat atrophy, tendon rupture, and infection. Risk of immune suppression and increased susceptibility to infection with steroid use. We discussed that too many injections may lead to weakening of the tendon and tendon rupture. Patient expressed understanding and would like to proceed with injection. - The skin over the LEFT wrist 1st extensor compartment just distal to the radial styloid was cleansed with alcohol and anesthetized with ethyl chloride. The 1st extensor compartment was injected with 3mg of celestone, 0.5cc of 1% lidocaine. Site was dressed with gauze and an ACE wrap. Patient tolerated the procedure well. - The skin over the RIGHT wrist 1st extensor compartment just distal to the radial styloid was cleansed with alcohol and anesthetized with ethyl chloride. The 1st extensor compartment was injected with 3mg of celestone, 0.5cc of 1% lidocaine. Site was dressed with gauze and an ACE wrap. Patient tolerated the procedure well. - discussed that it may take up to 1 week for symptoms to improve after CSI.  F/u PRN.

## 2024-02-29 NOTE — HISTORY OF PRESENT ILLNESS
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 Denies injury, reports pain started during her 3rd trimester of pregnancy, delivered 5 weeks ago. Reports h/o RIGHT thumb sx ~15 years ago.  Hx: Hypothyroidism. Sx: ASD/VSD repair (1999). R thumb UCL repair. [FreeTextEntry1] : BILATERAL wrist  [FreeTextEntry5] : BEENA diaz [RHD] 33 year old female is here today c/o LEFT > RIGHT wrist pain x 5 months w/o injury. pt states pain developed in 3rd trimester (gave birth 6 weeks ago) and is gradually increasing. h/o R thumb fx ~15 years ago.

## 2024-03-14 ENCOUNTER — NON-APPOINTMENT (OUTPATIENT)
Age: 34
End: 2024-03-14

## 2024-03-14 ENCOUNTER — APPOINTMENT (OUTPATIENT)
Dept: INTERNAL MEDICINE | Facility: CLINIC | Age: 34
End: 2024-03-14
Payer: COMMERCIAL

## 2024-03-14 VITALS
RESPIRATION RATE: 12 BRPM | HEART RATE: 67 BPM | OXYGEN SATURATION: 98 % | BODY MASS INDEX: 26.84 KG/M2 | WEIGHT: 167 LBS | SYSTOLIC BLOOD PRESSURE: 120 MMHG | DIASTOLIC BLOOD PRESSURE: 86 MMHG | HEIGHT: 66 IN

## 2024-03-14 DIAGNOSIS — Z80.3 FAMILY HISTORY OF MALIGNANT NEOPLASM OF BREAST: ICD-10-CM

## 2024-03-14 DIAGNOSIS — E03.9 HYPOTHYROIDISM, UNSPECIFIED: ICD-10-CM

## 2024-03-14 DIAGNOSIS — Z00.00 ENCOUNTER FOR GENERAL ADULT MEDICAL EXAMINATION W/OUT ABNORMAL FINDINGS: ICD-10-CM

## 2024-03-14 DIAGNOSIS — Z78.9 OTHER SPECIFIED HEALTH STATUS: ICD-10-CM

## 2024-03-14 DIAGNOSIS — Z87.74 PERSONAL HISTORY OF (CORRECTED) CONGENITAL MALFORMATIONS OF HEART AND CIRCULATORY SYSTEM: ICD-10-CM

## 2024-03-14 DIAGNOSIS — Q21.10 ATRIAL SEPTAL DEFECT, UNSPECIFIED: ICD-10-CM

## 2024-03-14 DIAGNOSIS — Z83.3 FAMILY HISTORY OF DIABETES MELLITUS: ICD-10-CM

## 2024-03-14 DIAGNOSIS — Z80.1 FAMILY HISTORY OF MALIGNANT NEOPLASM OF TRACHEA, BRONCHUS AND LUNG: ICD-10-CM

## 2024-03-14 PROCEDURE — 93000 ELECTROCARDIOGRAM COMPLETE: CPT

## 2024-03-14 PROCEDURE — 99385 PREV VISIT NEW AGE 18-39: CPT | Mod: 25

## 2024-03-14 PROCEDURE — 99203 OFFICE O/P NEW LOW 30 MIN: CPT

## 2024-03-14 RX ORDER — PRENATAL VIT NO.130/IRON/FOLIC 27MG-0.8MG
TABLET ORAL DAILY
Refills: 0 | Status: DISCONTINUED | COMMUNITY
End: 2024-03-14

## 2024-03-14 NOTE — ASSESSMENT
[FreeTextEntry1] : CPE CBC, CMP, A1c, lipid, UA EKG is sinus rhythm Up-to-date with GYN  1.  Weight gain Recheck thyroid function  2.  Hypothyroidism Currently on levothyroxine 50 mcg.  Pending TFTs  Follow-up 6 months pending labs

## 2024-03-14 NOTE — HISTORY OF PRESENT ILLNESS
[FreeTextEntry1] : difficulty losing weight  [de-identified] : Silvia is here today as a new patient.  She has a history of ASD and VSD from childhood status post repair and hypothyroidism.  Her hypothyroid was diagnosed after her first pregnancy when she was having cold intolerance, fatigue, weight gain and hair changes.  She had been on 50 mcg since that time.  No recent dose adjustments during her most recent pregnancy which she delivered in January.  Since then, has had very difficult time losing weight.  Prepregnancy weight was around 135 pounds.  She gained about 50 pounds during pregnancy.  She has been watching her diet and exercising 3-4 times a week with [employer] cycling classes and [employer] strength training classes.  Reports that she lost some weight pretty quickly after her first pregnancy and then lost remaining over the following year.  However, now she is having increasingly difficult time and feels she may have been gaining.  Also reports hair shedding.

## 2024-03-14 NOTE — REVIEW OF SYSTEMS
[Fever] : no fever [Fatigue] : fatigue [Chills] : no chills [Night Sweats] : no night sweats [Sore Throat] : no sore throat [Nasal Discharge] : no nasal discharge [Chest Pain] : no chest pain [Palpitations] : no palpitations [Shortness Of Breath] : no shortness of breath [Wheezing] : no wheezing [Cough] : no cough [Abdominal Pain] : no abdominal pain [Nausea] : no nausea [Constipation] : no constipation [Vomiting] : no vomiting [Diarrhea] : diarrhea [Dysuria] : no dysuria [Hematuria] : no hematuria [Joint Pain] : no joint pain [Muscle Pain] : no muscle pain [Headache] : no headache [Dizziness] : no dizziness [Anxiety] : no anxiety [Depression] : no depression

## 2024-03-14 NOTE — PHYSICAL EXAM
[Well Nourished] : well nourished [No Acute Distress] : no acute distress [Well Developed] : well developed [Well-Appearing] : well-appearing [Normal Sclera/Conjunctiva] : normal sclera/conjunctiva [No Lymphadenopathy] : no lymphadenopathy [Thyroid Normal, No Nodules] : the thyroid was normal and there were no nodules present [No Respiratory Distress] : no respiratory distress  [No Accessory Muscle Use] : no accessory muscle use [Clear to Auscultation] : lungs were clear to auscultation bilaterally [Normal Rate] : normal rate  [Regular Rhythm] : with a regular rhythm [Normal S1, S2] : normal S1 and S2 [No Murmur] : no murmur heard [No Carotid Bruits] : no carotid bruits [No Abdominal Bruit] : a ~M bruit was not heard ~T in the abdomen [No Edema] : there was no peripheral edema [Declined Breast Exam] : declined breast exam  [Soft] : abdomen soft [Non-distended] : non-distended [Non Tender] : non-tender [Normal Bowel Sounds] : normal bowel sounds [Normal Supraclavicular Nodes] : no supraclavicular lymphadenopathy [Normal Axillary Nodes] : no axillary lymphadenopathy [Normal Posterior Cervical Nodes] : no posterior cervical lymphadenopathy [Normal Anterior Cervical Nodes] : no anterior cervical lymphadenopathy [No CVA Tenderness] : no CVA  tenderness [No Spinal Tenderness] : no spinal tenderness [Normal Gait] : normal gait [Alert and Oriented x3] : oriented to person, place, and time [de-identified] : Does with OBGYN

## 2024-03-14 NOTE — HEALTH RISK ASSESSMENT
[Yes] : Yes [0] : 2) Feeling down, depressed, or hopeless: Not at all (0) [BSL4Ipdxh] : 0 [PHQ-2 Negative - No further assessment needed] : PHQ-2 Negative - No further assessment needed [Patient reported PAP Smear was normal] : Patient reported PAP Smear was normal [Never] : Never

## 2024-03-15 NOTE — OB PROVIDER LABOR PROGRESS NOTE - NS_SUBJECTIVE/OBJECTIVE_OBGYN_ALL_OB_FT
[facility]  INTERNAL MEDICINE PROGRESS NOTE   Patient: Pool Talavera  Today's Date: 3/15/2024    YOB: 1943  Admission Date: 3/13/2024    MRN: [id number]  Inpatient LOS: 1    Room: 50249/A  Hospital Day: Hospital Day: 3    Subjective   HISTORY AND SUBJECTIVE COMPLAINTS     Chief Complaint:   Weakness, hypotension    Interval History / Subjective:   Hypotensive overnight  Received fluid boluses, albumin  Hgb down one point (6.1 g/dl)  No signs of active bleeding   Blood pressures better on LUE. Has axillary stenosis right   Tolerating PO. Does have difficulties swallowing- chronic. Again, confirms he doesn't want egd for esophageal dilation. \"Why would I do that? It just closes up again after 2 weeks?\"    Hospital Course:  Pool Talavera is a 80 year old male who presented on 3/13/2024 with complaints of Weakness  follows with Joselito Diaz MD in the community.  Past medical history significant for CKD, HTN, esophageal Ca, ascending aortic aneurysm and AS s/p SAVR/ascending aorta replacement, SANDRA. Patient with history of recurrent hospitalizations and critical illness stay 10/30/23-2/6/24 related to probable adult failure to thrive and associated hypotension, renal failure with fall and found down for unknown time.  Had multiple pressure injuries including large sacral wound requiring debridement. Multiple wounds have since healed.      Patient brought to the ER for evaluation of weakness, lethargy. Was found to be hypotensive. SBP in the 70s upon arrival. Responsive to IV fluids.  Patient had not eaten or drank anything for 5 days due to not liking the food at facility. Denies having had nausea, vomiting, diarrhea. No abdominal pains. Denies swallowing difficulties. Does have history of esophageal cancer and received radiation. Has chronic dry mouth and recent egd with esophageal stricture s/p dilation. Was recommended for repeat dilation or stenting end of January 2024 not yet done.  Lab otherwise showed acute renal failure, low but stable hgb 7s. No leukocytosis. Noted for a paraphimosis and urology had to come to reduce. Patient admitted for further evaluation and treatment. UA has come back abnormal, possible UTI.     ROS:  Pertinent systems negative except as above.    Medications/Infusions:   Current Facility-Administered Medications   Medication Dose Route Frequency Provider Last Rate Last Admin    sodium hypochlorite (DAKIN'S) 0.062 % (1/8 strength) irrigation solution 1 application.  1 application. Topical 2 times per day Olga Estevez NP        heparin (porcine) injection 5,000 Units  5,000 Units Subcutaneous 3 times per day Olimpia Estrada, NP   5,000 Units at 03/15/24 0552    melatonin tablet 9 mg  9 mg Oral Nightly Olimpia Estrada, NP   9 mg at 03/14/24 2123    AMIODarone (PACERONE) tablet 200 mg  200 mg Oral Daily Olimpia Estrada, NP   200 mg at 03/15/24 0913    [Held by provider] apixaBAN (ELIQUIS) tablet 5 mg  5 mg Oral 2 times per day Olimpia Estrada NP        [Held by provider] metoPROLOL tartrate (LOPRESSOR) tablet 12.5 mg  12.5 mg Oral 2 times per day Olimpia Estrada, NP        midodrine (PROAMATINE) tablet 10 mg  10 mg Oral 3 times per day Olimpia Estrada, NP   10 mg at 03/15/24 0552    mirtazapine (REMERON) tablet 15 mg  15 mg Oral Daily Olimpia Estrada, NP   15 mg at 03/15/24 0913    pediatric multivitamin-zinc (DEKAs PLUS) (fat-soluble) capsule 1 capsule  1 capsule Oral Daily Olimpia Estrada, NP   1 capsule at 03/14/24 0920    pantoprazole (PROTONIX) EC tablet 40 mg  40 mg Oral Daily Olimpia Estrada, NP   40 mg at 03/15/24 0913    polyethylene glycol (MIRALAX) packet 17 g  17 g Oral Daily Olimpia Estrada, NP   17 g at 03/15/24 0913    tamsulosin (FLOMAX) capsule 0.4 mg  0.4 mg Oral Nightly Olimpia Estrada, NP   0.4 mg at 03/14/24 2123    thiamine (VITAMIN B1) tablet 100 mg  100 mg Oral Daily Olimpia Estrada NP   100 mg at 03/15/24 2921    zinc  sulfate (ZINCATE) capsule 220 mg  220 mg Oral Daily Olimpia Estrada NP   220 mg at 03/15/24 0913    sodium chloride 0.9 % injection 2 mL  2 mL Intracatheter 2 times per day Olimpia Estrada NP   2 mL at 03/15/24 0914    Potassium Standard Replacement Protocol (Levels 3.5 and lower)   Does not apply See Admin Instructions Olimpia Estrada NP        Potassium Replacement (Levels 3.6 - 4)   Does not apply See Admin Instructions Olimpia Estrada NP        Magnesium Standard Replacement Protocol   Does not apply See Admin Instructions Olimpia Estrada NP        levothyroxine (SYNTHROID, LEVOTHROID) tablet 175 mcg  175 mcg Oral QAM AC Olimpia Estrada NP   175 mcg at 03/15/24 0552         Objective   PHYSICAL EXAMINATION     Vital 24 Hour Range Most Recent Value   Temperature Temp  Min: 97.4 °F (36.3 °C)  Max: 99.1 °F (37.3 °C) 97.7 °F (36.5 °C)   Pulse Pulse  Min: 69  Max: 92 69   Respiratory Resp  Min: 18  Max: 23 20   Blood Pressure BP  Min: 66/48  Max: 112/54 98/48   Pulse Oximetry SpO2  Min: 94 %  Max: 97 % 97 %   Arterial BP No data recorded     O2 No data recorded       Recorded Intake and Output:  Intake/Output Summary (Last 24 hours) at 3/15/2024 1139  Last data filed at 3/15/2024 1030  Gross per 24 hour   Intake 396.41 ml   Output 1750 ml   Net -1353.59 ml      Recorded Last Stool Occurrence:       Vital Most Recent Value First Value   Weight 91.2 kg (201 lb 1 oz) Weight: 90.1 kg (198 lb 10.2 oz)   Height 6' 3\" (190.5 cm) Height: 6' 3\" (190.5 cm)   BMI 25.13 N/A     Elderly-aged male chronically ill-looking, cachetic   HEENT PERRL EOMI  Oral mucosa pale moist no thrush  Neck no JVD  Chest bilateral equal expansion  Lungs decreased breath sounds at bases  CVS S1-S2 regular  Abdomen soft nontender bowel sounds are present  Ext trace edema no calf tenderness  CNS alert, right upper extremity weakness mostly wrist drop  Generally weak    TEST RESULTS     Labs: The Laboratory values listed below have  been reviewed and pertinent findings discussed in the Assessment and Plan.    Recent Labs   Lab 03/15/24  1010 03/15/24  0342 03/14/24  0719 03/14/24  0329 03/13/24  1500   WBC 4.5  --  5.5  --  6.3   HCT 19.4*  --  22.7*  --  23.7*   HGB 6.1*  --  7.2*  --  7.3*     --  172  --  184   SODIUM  --   --   --  136 134*   POTASSIUM  --  4.3  --  4.0 3.7   CHLORIDE  --   --   --  105 102   CO2  --   --   --  23 27   CALCIUM  --   --   --  9.6 9.4   GLUCOSE  --   --   --  95 125*   BUN  --   --   --  47* 49*   CREATININE  --   --   --  1.64* 1.99*   AST  --   --   --  56* 56*   GPT  --   --   --  40 39   ALKPT  --   --   --  103 105   BILIRUBIN  --   --   --  0.6 0.6   ALBUMIN  --   --   --  2.1* 2.2*     Laboratory values:   Recent Labs   Lab 03/13/24  1500   PCT 1.05*     Recent Labs   Lab 03/14/24  2136 03/15/24  0405   GLUCOSE BEDSIDE 116* 106*       @covidlabs@  Recent Labs   Lab 03/13/24  1500   HTROPI 52       Lab Results   Component Value Date    VB12 592 01/23/2024    GEOFF 9.4 01/23/2024    VITD25 107.4 (H) 03/24/2023    TSH 6.084 (H) 02/14/2024    HGBA1C 5.3 01/03/2024        Lab Results   Component Value Date    CHOLESTEROL 85 01/04/2024    HDL 18 (L) 01/04/2024    CALCLDL 33 01/04/2024        Lab Results   Component Value Date    CRISTIANA 94 09/12/2015    USPG 1.015 03/14/2024    UPROT 30 (A) 03/14/2024    UWBC Large (A) 03/14/2024    URBC Large (A) 03/14/2024    UNITR Negative 03/14/2024    UPH 5.5 03/14/2024    UBACTRA Few (A) 03/14/2024         Radiology: Imaging studies have been reviewed and pertinent findings discussed in the Assessment and Plan.  No results found for any visits on 03/13/24 (from the past 48 hour(s)).     ANCILLARY ORDERS     Diet:  Regular Diet  3 Times/Day W Meals; Ensure Plus Hp/Standard Oral Supplement, Chocolate Oral Nutrition Supplement  Telemetry: Off  Consults:    IP CONSULT TO SOCIAL WORK  IP CONSULT TO NUTRITION SERVICES  IP CONSULT TO GI  IP CONSULT TO WOUND CARE MEDICAL  OB PA Note    Pt evaluated at bedside for AROM s/p epidural placement. pt currently comfortable    ICU Vital Signs Last 24 Hrs  T(C): 37 (19 Jan 2024 10:55), Max: 37.0 (19 Jan 2024 10:27)  T(F): 98.6 (19 Jan 2024 10:27), Max: 98.6 (19 Jan 2024 10:27)  HR: 67 (19 Jan 2024 12:27) (62 - 85)  BP: 111/57 (19 Jan 2024 12:27) (111/57 - 129/52)  BP(mean): --  ABP: --  ABP(mean): --  RR: 17 (19 Jan 2024 10:55) (17 - 17)  SpO2: 100% (19 Jan 2024 12:25) (88% - 100%)    O2 Parameters below as of 19 Jan 2024 10:27  Patient On (Oxygen Delivery Method): room air        Gen: NAD    VE: 6/90/-2 AROM - clear fluid PROVIDER  IP CONSULT TO NUTRITION SERVICES  Therapy Orders:   PT and OT Orders Placed this Encounter   Procedures    Occupational Therapy Evaluation    Occupational Therapy Treatment    Physical Therapy Evaluation    Physical Therapy Treatment       ADVANCED DIRECTIVES     Code Status: Full Resuscitation       ASSESSMENT AND PLAN     Smoking status: former smoker    Nutrition status: severe protein calorie malnutrition  Body mass index is 25.13 kg/m². - Overweight BMI 25-29  DVT Prophylaxis: Heparin 5000 units sub q tid               Assessment:    Hypotension, hypovolemic related- also no BP RUE due to stenosis right brachiocephalic artery   Dehydration  Adult failure to thrive / severe protein calorie malnutrition   Multiple pressure injuries, varying stages of healing, present on admission  Acute renal failure. Pre-renal   Paraphimosis   Anemia in chronic disease / wounds  Esophageal stricture s/p egd with dilation 12/27/23  Hypothyroidism   Chronic atrial fibrillation   History of esophageal cancer  H/o stroke with possible vascular dementia  H/o PE  Right shoulder pain, moderate AC joint osteoarthritis and chronic rotator cuff injury / right brachiocephalic stenosis   Bilateral hand numbness, right > left, probable CTS / chronic right wrist pain and claudication     Plan:    Hemodynamic monitoring: no BPs on RUE  Gentle IV fluids  Optimize nutrition as able  Wound care, offloading  Dc rocephin  Do not retract the foreskin for 1 week   Monitor H&H, transfuse prn- one unit today   Patient appears to have some swallowing difficulties. GI consulted regarding recommended esophageal dilation- patient declining further evaluation   Lab in AM  Hold eliquis for now  heparin subcu for DVT  Code status: FULL per patient            DISCHARGE PLANNING     The patient's treatment plans were discussed with patient and RN.     Recommendations for Discharge    [facility]acute [facility]   PT     OT     SLP        Anticipated  discharge destination: Skilled Nursing Facility SNF  Expected Discharge Date: 3/18/24  Barriers to Discharge: Patient is not medically ready and needs to remain in the hospital today due to monitor BP and worsening anemia requiring transfusion, palliative care evaluation       Olimpia Estrada NP  3/15/2024  11:39 AM  I saw and evaluated the patient personally with more than 50% of encounter performed by myself. I performed the history, exam and medical decision making & reviewed all radiology/cardiac & lab data for this encounter and agree with the above note & updated.  Assessment and plan updated as appropriate.  Ongoing failure to thrive, may need palliative care to reassess    Jigar García MD

## 2024-03-20 DIAGNOSIS — E78.5 HYPERLIPIDEMIA, UNSPECIFIED: ICD-10-CM

## 2024-03-20 LAB
ESTIMATED AVERAGE GLUCOSE: 111 MG/DL
HBA1C MFR BLD HPLC: 5.5 %

## 2024-04-03 LAB
ALBUMIN SERPL ELPH-MCNC: 5.3 G/DL
ALP BLD-CCNC: 94 U/L
ALT SERPL-CCNC: 50 U/L
ANION GAP SERPL CALC-SCNC: 15 MMOL/L
APPEARANCE: CLEAR
AST SERPL-CCNC: 41 U/L
BACTERIA: NEGATIVE /HPF
BASOPHILS # BLD AUTO: 0.04 K/UL
BASOPHILS NFR BLD AUTO: 0.6 %
BILIRUB SERPL-MCNC: 0.6 MG/DL
BILIRUBIN URINE: NEGATIVE
BLOOD URINE: NEGATIVE
BUN SERPL-MCNC: 13 MG/DL
CALCIUM SERPL-MCNC: 9.8 MG/DL
CAST: 0 /LPF
CHLORIDE SERPL-SCNC: 104 MMOL/L
CHOLEST SERPL-MCNC: 265 MG/DL
CO2 SERPL-SCNC: 20 MMOL/L
COLOR: YELLOW
CREAT SERPL-MCNC: 1.06 MG/DL
EGFR: 72 ML/MIN/1.73M2
EOSINOPHIL # BLD AUTO: 0.12 K/UL
EOSINOPHIL NFR BLD AUTO: 1.9 %
EPITHELIAL CELLS: 5 /HPF
GLUCOSE QUALITATIVE U: NEGATIVE MG/DL
GLUCOSE SERPL-MCNC: 102 MG/DL
HCT VFR BLD CALC: 41.2 %
HDLC SERPL-MCNC: 65 MG/DL
HGB BLD-MCNC: 13.1 G/DL
IMM GRANULOCYTES NFR BLD AUTO: 0.2 %
KETONES URINE: NEGATIVE MG/DL
LDLC SERPL CALC-MCNC: 182 MG/DL
LEUKOCYTE ESTERASE URINE: NEGATIVE
LYMPHOCYTES # BLD AUTO: 2.35 K/UL
LYMPHOCYTES NFR BLD AUTO: 37.8 %
MAN DIFF?: NORMAL
MCHC RBC-ENTMCNC: 27.9 PG
MCHC RBC-ENTMCNC: 31.8 GM/DL
MCV RBC AUTO: 87.7 FL
MICROSCOPIC-UA: NORMAL
MONOCYTES # BLD AUTO: 0.48 K/UL
MONOCYTES NFR BLD AUTO: 7.7 %
NEUTROPHILS # BLD AUTO: 3.22 K/UL
NEUTROPHILS NFR BLD AUTO: 51.8 %
NITRITE URINE: NEGATIVE
NONHDLC SERPL-MCNC: 200 MG/DL
PH URINE: 5.5
PLATELET # BLD AUTO: 202 K/UL
POTASSIUM SERPL-SCNC: 4.2 MMOL/L
PROT SERPL-MCNC: 7.9 G/DL
PROTEIN URINE: NEGATIVE MG/DL
RBC # BLD: 4.7 M/UL
RBC # FLD: 14.1 %
RED BLOOD CELLS URINE: 2 /HPF
SODIUM SERPL-SCNC: 139 MMOL/L
SPECIFIC GRAVITY URINE: 1.02
T4 FREE SERPL-MCNC: 1.1 NG/DL
TRIGL SERPL-MCNC: 102 MG/DL
TSH SERPL-ACNC: 2.17 UIU/ML
UROBILINOGEN URINE: 0.2 MG/DL
WBC # FLD AUTO: 6.22 K/UL
WHITE BLOOD CELLS URINE: 4 /HPF

## 2024-04-08 ENCOUNTER — LABORATORY RESULT (OUTPATIENT)
Age: 34
End: 2024-04-08

## 2024-04-29 ENCOUNTER — APPOINTMENT (OUTPATIENT)
Dept: ORTHOPEDIC SURGERY | Facility: CLINIC | Age: 34
End: 2024-04-29
Payer: COMMERCIAL

## 2024-04-29 DIAGNOSIS — M65.4 RADIAL STYLOID TENOSYNOVITIS [DE QUERVAIN]: ICD-10-CM

## 2024-04-29 PROCEDURE — 99213 OFFICE O/P EST LOW 20 MIN: CPT

## 2024-04-29 NOTE — HISTORY OF PRESENT ILLNESS
[de-identified] : 4/29/24: f/u bilateral DeQuervain's tenosynovitis. s/p CSI on 2/29/24. LEFT side is doing well, but RIGHT side pain returned ~1.5 week ago.  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 Denies injury, reports pain started during her 3rd trimester of pregnancy, delivered 5 weeks ago. Reports h/o RIGHT thumb sx ~15 years ago.  Hx: Hypothyroidism. Sx: ASD/VSD repair (1999). R thumb UCL repair. [FreeTextEntry1] : RIGHT wrist  [FreeTextEntry5] : BEENA is here today to follow up on her RIGHT > LEFT wrist. states CSI from last visit wore off in R wrist ~1.5 weeks ago. would like to repeat CSI today.

## 2024-04-29 NOTE — ASSESSMENT
[FreeTextEntry1] : - prescribed OT for R wrist. - recommend Voltaren gel TID x 2 weeks. Reviewed contra-indicated medical conditions (eg liver disease, kidney disease, or GI ulcer/bleeding) or medications (eg blood thinners). Discussed possible GI and blood pressure side effects. - recommend moist heat, activity modification PRN.  F/u PRN.

## 2024-04-29 NOTE — IMAGING
[de-identified] : LEFT HAND skin intact. no swelling. no TTP. wrist ROM: good extension, flexion. good pronation, supination. good EPL, FPL. good finger extension, flex to full fist. good finger abduction and adduction.  SILT to median, ulnar, radial distribution.  palpable radial pulse, brisk cap refill all digits. no triggering. negative Finkelstein's test.   RIGHT HAND skin intact. minimal swelling of 1st ext compartment. TTP to 1st ext compartment. wrist ROM: good extension, flexion. good pronation, supination. good EPL, FPL. good finger extension, flex to full fist. good finger abduction and adduction.  SILT to median, ulnar, radial distribution.  palpable radial pulse, brisk cap refill all digits. no triggering. + Finkelstein's test.

## 2024-06-17 RX ORDER — LEVOTHYROXINE SODIUM 0.05 MG/1
50 TABLET ORAL DAILY
Qty: 90 | Refills: 0 | Status: ACTIVE | COMMUNITY
Start: 2024-06-17 | End: 1900-01-01

## 2024-08-05 ENCOUNTER — APPOINTMENT (OUTPATIENT)
Dept: ENDOCRINOLOGY | Facility: CLINIC | Age: 34
End: 2024-08-05

## 2024-08-05 PROCEDURE — 99204 OFFICE O/P NEW MOD 45 MIN: CPT

## 2024-08-05 NOTE — HISTORY OF PRESENT ILLNESS
[FreeTextEntry1] : 33 year F referred for management of hypothyroidism   Patient with past medical hx as below,   Related Thyroid History:   Prior or current medication thyroid use: levothyroxine 50mcg po daily  Known family or personal hx of thyroid disease: No History of hemithyroidectomy/ thyroidectomy: No Goiter or hx of goiter : No Known Hx of autoimmune disease: No History of Radioactive iodine therapy/ Chest or Neck radiation therapy: No  Reported Symptoms:   Fatigue: No Weight gain without significant change in appetite: yes, after delivery, able to lose with exercise Cold intolerance: No Depression or memory impairment: No Menstrual irregularities (menorrhagia), infertility: on COCP Weakness, muscle cramps: No Constipation: No Hypersomnolence: No  Hoarseness: No Dyspnea: No Dysphagia: No

## 2024-08-06 ENCOUNTER — NON-APPOINTMENT (OUTPATIENT)
Age: 34
End: 2024-08-06

## 2024-10-05 ENCOUNTER — APPOINTMENT (OUTPATIENT)
Dept: INTERNAL MEDICINE | Facility: CLINIC | Age: 34
End: 2024-10-05

## 2024-11-18 NOTE — OB RN PATIENT PROFILE - NSICDXPASTMEDICALHX_GEN_ALL_CORE_FT
Dear Dr Gillespie,    Patient:  Alexandra Martin     : [birth year]    Patient has a scheduled procedure Upper Endoscopy (EGD) 24 and in order to ensure patient safety, we would like to confirm if he/she can be cleared for the procedure.      Please send clearance letter ASAP to: [phone].    Thank you for your prompt reply.    [facility] Endoscopy Scheduling   
PAST MEDICAL HISTORY:  H/O congenital atrial septal defect (ASD) repair

## 2024-11-20 ENCOUNTER — APPOINTMENT (OUTPATIENT)
Dept: OTOLARYNGOLOGY | Facility: CLINIC | Age: 34
End: 2024-11-20
Payer: COMMERCIAL

## 2024-11-20 VITALS
HEIGHT: 66 IN | WEIGHT: 150 LBS | BODY MASS INDEX: 24.11 KG/M2 | DIASTOLIC BLOOD PRESSURE: 81 MMHG | SYSTOLIC BLOOD PRESSURE: 122 MMHG | HEART RATE: 61 BPM

## 2024-11-20 DIAGNOSIS — Z86.39 PERSONAL HISTORY OF OTHER ENDOCRINE, NUTRITIONAL AND METABOLIC DISEASE: ICD-10-CM

## 2024-11-20 DIAGNOSIS — R42 DIZZINESS AND GIDDINESS: ICD-10-CM

## 2024-11-20 PROCEDURE — 99203 OFFICE O/P NEW LOW 30 MIN: CPT

## 2024-11-20 PROCEDURE — 92567 TYMPANOMETRY: CPT

## 2024-11-20 PROCEDURE — 92557 COMPREHENSIVE HEARING TEST: CPT

## 2024-12-20 NOTE — OB PROVIDER H&P - NS ATTEND BILL GEN_ALL_CORE
[Hypertension] : Hypertension [FreeTextEntry6] : Pt is here for BP check and fasting blood work. [Does not check BP] : The patient is not checking blood pressure [<140/90] : Target blood pressure is <140/90 [Target goal met] : BP target goal met Attending to bill

## 2025-02-24 ENCOUNTER — APPOINTMENT (OUTPATIENT)
Dept: ENDOCRINOLOGY | Facility: CLINIC | Age: 35
End: 2025-02-24

## 2025-03-18 ENCOUNTER — APPOINTMENT (OUTPATIENT)
Dept: INTERNAL MEDICINE | Facility: CLINIC | Age: 35
End: 2025-03-18
Payer: COMMERCIAL

## 2025-03-18 VITALS
HEART RATE: 66 BPM | OXYGEN SATURATION: 99 % | WEIGHT: 153 LBS | DIASTOLIC BLOOD PRESSURE: 82 MMHG | RESPIRATION RATE: 14 BRPM | SYSTOLIC BLOOD PRESSURE: 123 MMHG | HEIGHT: 66 IN | BODY MASS INDEX: 24.59 KG/M2

## 2025-03-18 DIAGNOSIS — Z78.9 OTHER SPECIFIED HEALTH STATUS: ICD-10-CM

## 2025-03-18 DIAGNOSIS — Z00.00 ENCOUNTER FOR GENERAL ADULT MEDICAL EXAMINATION W/OUT ABNORMAL FINDINGS: ICD-10-CM

## 2025-03-18 DIAGNOSIS — E03.9 HYPOTHYROIDISM, UNSPECIFIED: ICD-10-CM

## 2025-03-18 PROCEDURE — 99395 PREV VISIT EST AGE 18-39: CPT

## 2025-03-18 PROCEDURE — 36415 COLL VENOUS BLD VENIPUNCTURE: CPT

## 2025-03-18 PROCEDURE — 93000 ELECTROCARDIOGRAM COMPLETE: CPT

## 2025-03-18 RX ORDER — SPIRONOLACTONE 50 MG/1
50 TABLET ORAL
Refills: 0 | Status: ACTIVE | COMMUNITY
Start: 2025-03-18

## 2025-03-19 ENCOUNTER — TRANSCRIPTION ENCOUNTER (OUTPATIENT)
Age: 35
End: 2025-03-19

## 2025-03-19 LAB
ALBUMIN SERPL ELPH-MCNC: 5.1 G/DL
ALP BLD-CCNC: 86 U/L
ALT SERPL-CCNC: 18 U/L
ANION GAP SERPL CALC-SCNC: 15 MMOL/L
APPEARANCE: CLEAR
AST SERPL-CCNC: 18 U/L
BACTERIA: NEGATIVE /HPF
BASOPHILS # BLD AUTO: 0.03 K/UL
BASOPHILS NFR BLD AUTO: 0.4 %
BILIRUB SERPL-MCNC: 0.4 MG/DL
BILIRUBIN URINE: NEGATIVE
BLOOD URINE: NEGATIVE
BUN SERPL-MCNC: 14 MG/DL
CALCIUM SERPL-MCNC: 9.5 MG/DL
CAST: 0 /LPF
CHLORIDE SERPL-SCNC: 104 MMOL/L
CHOLEST SERPL-MCNC: 173 MG/DL
CO2 SERPL-SCNC: 23 MMOL/L
COLOR: YELLOW
CREAT SERPL-MCNC: 0.95 MG/DL
EGFRCR SERPLBLD CKD-EPI 2021: 82 ML/MIN/1.73M2
EOSINOPHIL # BLD AUTO: 0.03 K/UL
EOSINOPHIL NFR BLD AUTO: 0.4 %
EPITHELIAL CELLS: 5 /HPF
ESTIMATED AVERAGE GLUCOSE: 105 MG/DL
GLUCOSE QUALITATIVE U: NEGATIVE MG/DL
GLUCOSE SERPL-MCNC: 92 MG/DL
HBA1C MFR BLD HPLC: 5.3 %
HCT VFR BLD CALC: 40.9 %
HDLC SERPL-MCNC: 47 MG/DL
HGB BLD-MCNC: 13.2 G/DL
IMM GRANULOCYTES NFR BLD AUTO: 0.3 %
KETONES URINE: NEGATIVE MG/DL
LDLC SERPL-MCNC: 112 MG/DL
LEUKOCYTE ESTERASE URINE: NEGATIVE
LYMPHOCYTES # BLD AUTO: 1.96 K/UL
LYMPHOCYTES NFR BLD AUTO: 26.9 %
MAN DIFF?: NORMAL
MCHC RBC-ENTMCNC: 29.7 PG
MCHC RBC-ENTMCNC: 32.3 G/DL
MCV RBC AUTO: 91.9 FL
MICROSCOPIC-UA: NORMAL
MONOCYTES # BLD AUTO: 0.54 K/UL
MONOCYTES NFR BLD AUTO: 7.4 %
NEUTROPHILS # BLD AUTO: 4.7 K/UL
NEUTROPHILS NFR BLD AUTO: 64.6 %
NITRITE URINE: NEGATIVE
NONHDLC SERPL-MCNC: 127 MG/DL
PH URINE: 6
PLATELET # BLD AUTO: 158 K/UL
POTASSIUM SERPL-SCNC: 4.2 MMOL/L
PROT SERPL-MCNC: 7.6 G/DL
PROTEIN URINE: NEGATIVE MG/DL
RBC # BLD: 4.45 M/UL
RBC # FLD: 12.3 %
RED BLOOD CELLS URINE: 3 /HPF
SODIUM SERPL-SCNC: 142 MMOL/L
SPECIFIC GRAVITY URINE: 1.01
T3FREE SERPL-MCNC: 2.74 PG/ML
T4 FREE SERPL-MCNC: 1.4 NG/DL
TRIGL SERPL-MCNC: 81 MG/DL
TSH SERPL-ACNC: 3.55 UIU/ML
UROBILINOGEN URINE: 0.2 MG/DL
WBC # FLD AUTO: 7.28 K/UL
WHITE BLOOD CELLS URINE: 0 /HPF